# Patient Record
Sex: FEMALE | Race: WHITE | NOT HISPANIC OR LATINO | URBAN - METROPOLITAN AREA
[De-identification: names, ages, dates, MRNs, and addresses within clinical notes are randomized per-mention and may not be internally consistent; named-entity substitution may affect disease eponyms.]

---

## 2017-04-11 ENCOUNTER — IMPORTED ENCOUNTER (OUTPATIENT)
Dept: URBAN - METROPOLITAN AREA CLINIC 50 | Facility: CLINIC | Age: 67
End: 2017-04-11

## 2017-09-22 ENCOUNTER — IMPORTED ENCOUNTER (OUTPATIENT)
Dept: URBAN - METROPOLITAN AREA CLINIC 50 | Facility: CLINIC | Age: 67
End: 2017-09-22

## 2017-12-11 ENCOUNTER — IMPORTED ENCOUNTER (OUTPATIENT)
Dept: URBAN - METROPOLITAN AREA CLINIC 50 | Facility: CLINIC | Age: 67
End: 2017-12-11

## 2018-01-09 ENCOUNTER — IMPORTED ENCOUNTER (OUTPATIENT)
Dept: URBAN - METROPOLITAN AREA CLINIC 50 | Facility: CLINIC | Age: 68
End: 2018-01-09

## 2018-04-19 ENCOUNTER — IMPORTED ENCOUNTER (OUTPATIENT)
Dept: URBAN - METROPOLITAN AREA CLINIC 50 | Facility: CLINIC | Age: 68
End: 2018-04-19

## 2018-04-25 ENCOUNTER — IMPORTED ENCOUNTER (OUTPATIENT)
Dept: URBAN - METROPOLITAN AREA CLINIC 50 | Facility: CLINIC | Age: 68
End: 2018-04-25

## 2018-10-10 ENCOUNTER — IMPORTED ENCOUNTER (OUTPATIENT)
Dept: URBAN - METROPOLITAN AREA CLINIC 50 | Facility: CLINIC | Age: 68
End: 2018-10-10

## 2019-01-08 NOTE — PATIENT DISCUSSION
REFRACTIVE ERROR, OU - DISCUSSED OPTION OF CORRECTING AT THE TIME OF CATARACT SURGERY. PT UNDERSTANDS AND ELECTS TO PROCEED WITH BOTH EYES FOR DISTANCE AND UNDERSTANDS NEED FOR GLASSES FOR NEAR.

## 2019-01-08 NOTE — PATIENT DISCUSSION
CATARACT, OU - EQUALLY VISUALLY SIGNIFICANT ALTHOUGH PATIENT IS BOTHERED BY OS MORE . SCHEDULE SX OS THEN LATER IN OD IF VISUAL SYMPTOMS PERSIST.  GLS RX GIVEN TO FILL IF DESIRES IN THE EVENT PT DOES NOT PROCEED W/ SX.

## 2019-01-08 NOTE — PATIENT DISCUSSION
Surgery  Counseling: I have discussed the option of glasses versus cataract surgery versus following. It was explained that when vision no longer meets the patient's visual needs and a new prescription for glasses is not likely to improve the patient's visual symptoms, the option of cataract surgery is a reasonable next step. It was explained that there is no guarantee that removing the cataract will improve their visual symptoms, however; it is believed that the cataract is contributing to the patient's visual impairment and surgery may significantly improve both the visual and functional status of the patient. The risks, benefits and alternatives of surgery were discussed with the patient. After this discussion, the patient desires to proceed with cataract surgery with implantation of an intraocular lens to improve vision and reduce glare.

## 2019-01-30 NOTE — PATIENT DISCUSSION
BECAUSE OF INCREASED INFLAMMATION,  ADDED DUREZOL Q 2 HOURS.   IF WORSENS,  DR ANTONY CAN SEND HER BACK FOR FURTHER EVAL

## 2019-02-26 ENCOUNTER — IMPORTED ENCOUNTER (OUTPATIENT)
Dept: URBAN - METROPOLITAN AREA CLINIC 50 | Facility: CLINIC | Age: 69
End: 2019-02-26

## 2019-05-17 ENCOUNTER — IMPORTED ENCOUNTER (OUTPATIENT)
Dept: URBAN - METROPOLITAN AREA CLINIC 50 | Facility: CLINIC | Age: 69
End: 2019-05-17

## 2019-05-17 NOTE — PATIENT DISCUSSION
"""suspect based on c/d ratio. IOP controlled OU. Continue Latanoprost QHS OU.  Consider iStent inj ""

## 2019-05-22 ENCOUNTER — IMPORTED ENCOUNTER (OUTPATIENT)
Dept: URBAN - METROPOLITAN AREA CLINIC 50 | Facility: CLINIC | Age: 69
End: 2019-05-22

## 2019-07-16 ENCOUNTER — IMPORTED ENCOUNTER (OUTPATIENT)
Dept: URBAN - METROPOLITAN AREA CLINIC 50 | Facility: CLINIC | Age: 69
End: 2019-07-16

## 2019-08-02 ENCOUNTER — IMPORTED ENCOUNTER (OUTPATIENT)
Dept: URBAN - METROPOLITAN AREA CLINIC 50 | Facility: CLINIC | Age: 69
End: 2019-08-02

## 2019-08-14 ENCOUNTER — IMPORTED ENCOUNTER (OUTPATIENT)
Dept: URBAN - METROPOLITAN AREA CLINIC 50 | Facility: CLINIC | Age: 69
End: 2019-08-14

## 2019-08-14 NOTE — PATIENT DISCUSSION
"""S/P IOL OD: Tecgenesis  ZKB00 24 (ORA) +ORA/Femto +Omidria +iStent inject. Continue post operative instructions and drops per schedule.  Continue glaucoma drops as prescribed."""

## 2019-08-23 ENCOUNTER — IMPORTED ENCOUNTER (OUTPATIENT)
Dept: URBAN - METROPOLITAN AREA CLINIC 50 | Facility: CLINIC | Age: 69
End: 2019-08-23

## 2019-08-28 ENCOUNTER — IMPORTED ENCOUNTER (OUTPATIENT)
Dept: URBAN - METROPOLITAN AREA CLINIC 50 | Facility: CLINIC | Age: 69
End: 2019-08-28

## 2019-08-28 PROBLEM — Z96.1: Noted: 2019-08-28

## 2019-08-28 NOTE — PATIENT DISCUSSION
"""S/P IOL OS: Tecnis MF ZKB00 25.0 +Femto/Arcs +Omidria +iStent inject. Continue post operative instructions and drops per schedule.  Continue glaucoma drops as prescribed."""

## 2019-09-06 ENCOUNTER — IMPORTED ENCOUNTER (OUTPATIENT)
Dept: URBAN - METROPOLITAN AREA CLINIC 50 | Facility: CLINIC | Age: 69
End: 2019-09-06

## 2019-09-30 ENCOUNTER — IMPORTED ENCOUNTER (OUTPATIENT)
Dept: URBAN - METROPOLITAN AREA CLINIC 50 | Facility: CLINIC | Age: 69
End: 2019-09-30

## 2019-09-30 NOTE — PATIENT DISCUSSION
"""S/P iStent OU. IOP controlled OU. Last dose of Latanoprost OU 3 days ago.  Patient to continue ""

## 2019-09-30 NOTE — PATIENT DISCUSSION
"""S/P IOL OU: OD: Tecnis MF ZKB00 24 (ORA) +ORAFemtoOmidria +iStent inject. OS: Tecnis MF ZKB00 25.0/Arcs +iStent inject. "

## 2019-12-16 ENCOUNTER — IMPORTED ENCOUNTER (OUTPATIENT)
Dept: URBAN - METROPOLITAN AREA CLINIC 50 | Facility: CLINIC | Age: 69
End: 2019-12-16

## 2020-01-03 ENCOUNTER — IMPORTED ENCOUNTER (OUTPATIENT)
Dept: URBAN - METROPOLITAN AREA CLINIC 50 | Facility: CLINIC | Age: 70
End: 2020-01-03

## 2020-11-19 ENCOUNTER — IMPORTED ENCOUNTER (OUTPATIENT)
Dept: URBAN - METROPOLITAN AREA CLINIC 50 | Facility: CLINIC | Age: 70
End: 2020-11-19

## 2020-11-23 ENCOUNTER — IMPORTED ENCOUNTER (OUTPATIENT)
Dept: URBAN - METROPOLITAN AREA CLINIC 50 | Facility: CLINIC | Age: 70
End: 2020-11-23

## 2021-05-14 ASSESSMENT — VISUAL ACUITY
OS_SC: 20/50-
OD_PH: 20/25
OD_CC: J2@ 16 IN
OS_CC: J1
OS_OTHER: 20/40. 20/50.
OD_SC: 20/30-1
OS_CC: 20/20-2
OS_PH: @ 16 IN
OS_CC: J1
OD_BAT: 20/60
OS_PH: 20/30
OS_SC: 20/25
OS_CC: 20/30
OS_CC: J1+
OD_OTHER: 20/60. 20/80.
OS_SC: 20/30
OD_PH: @ 16 IN
OD_SC: 20/25
OD_SC: 20/25
OS_SC: 20/40
OS_CC: J2@ 17 IN
OS_CC: J1@ 16 IN
OD_PH: @ 16 IN
OD_OTHER: 20/60. 20/70.
OD_SC: 20/30
OD_PH: @ 20 IN
OS_BAT: 20/50
OD_CC: J1
OD_BAT: 20/40
OD_SC: 20/30-1
OS_OTHER: 20/50. 20/70.
OS_BAT: 20/40
OD_CC: J1
OD_BAT: 20/60
OD_SC: 20/70-
OD_CC: J2@ 17 IN
OS_SC: 20/25
OD_CC: J1+
OS_BAT: 20/40
OS_BAT: 20/50
OD_SC: 20/60+1
OD_OTHER: 20/60. 20/80.
OD_BAT: 20/60
OS_OTHER: 20/40. 20/50.
OS_SC: 20/25
OD_OTHER: 20/40. 20/50.
OS_CC: J2@ 16 IN
OS_PH: @ 16 IN
OD_CC: J1@ 16 IN
OS_OTHER: 20/50. 20/50.
OD_PH: 20/40
OD_PH: 20/25-
OD_CC: 20/100

## 2021-05-14 ASSESSMENT — TONOMETRY
OD_IOP_MMHG: 14
OD_IOP_MMHG: 16
OD_IOP_MMHG: 15
OD_IOP_MMHG: 17
OS_IOP_MMHG: 15
OS_IOP_MMHG: 26
OS_IOP_MMHG: 16
OD_IOP_MMHG: 15
OD_IOP_MMHG: 13
OS_IOP_MMHG: 17
OD_IOP_MMHG: 16
OS_IOP_MMHG: 15
OD_IOP_MMHG: 16
OS_IOP_MMHG: 14
OD_IOP_MMHG: 15
OS_IOP_MMHG: 16
OS_IOP_MMHG: 15
OD_IOP_MMHG: 16
OD_IOP_MMHG: 21
OD_IOP_MMHG: 16
OS_IOP_MMHG: 25
OD_IOP_MMHG: 16
OS_IOP_MMHG: 16
OS_IOP_MMHG: 13
OS_IOP_MMHG: 14
OD_IOP_MMHG: 14
OS_IOP_MMHG: 15

## 2021-05-14 ASSESSMENT — PACHYMETRY
OS_CT_UM: 571
OD_CT_UM: 570
OS_CT_UM: 571
OD_CT_UM: 570
OS_CT_UM: 571
OD_CT_UM: 570
OS_CT_UM: 571
OD_CT_UM: 570
OS_CT_UM: 571

## 2021-09-27 ENCOUNTER — ANNUAL COMPREHENSIVE EXAM (OUTPATIENT)
Dept: URBAN - METROPOLITAN AREA CLINIC 53 | Facility: CLINIC | Age: 71
End: 2021-09-27

## 2021-09-27 DIAGNOSIS — H35.342: ICD-10-CM

## 2021-09-27 DIAGNOSIS — H26.492: ICD-10-CM

## 2021-09-27 DIAGNOSIS — H26.491: ICD-10-CM

## 2021-09-27 DIAGNOSIS — H40.1130: ICD-10-CM

## 2021-09-27 PROCEDURE — 66821 AFTER CATARACT LASER SURGERY: CPT

## 2021-09-27 PROCEDURE — 92134 CPTRZ OPH DX IMG PST SGM RTA: CPT

## 2021-09-27 PROCEDURE — 92014 COMPRE OPH EXAM EST PT 1/>: CPT

## 2021-09-27 RX ORDER — PREDNISOLONE ACETATE 10 MG/ML
1 SUSPENSION/ DROPS OPHTHALMIC TWICE A DAY
Start: 2021-09-27

## 2021-09-27 ASSESSMENT — VISUAL ACUITY
OS_PH: 20/100
OD_PH: 20/40
OS_SC: 20/100
OD_SC: 20/50

## 2021-09-27 ASSESSMENT — TONOMETRY
OS_IOP_MMHG: 16
OD_IOP_MMHG: 16

## 2021-09-27 NOTE — PATIENT DISCUSSION
Will hold off on treatment at this time secondary to macular hole.  Will consider laser after evaluation and possible treatment from retina specialist.

## 2021-09-27 NOTE — PATIENT DISCUSSION
PCO ([id number]): Visually significant PCO present on exam today. Recommend YAG laser capsulotomy to improve vision and decrease glare symptoms. RBAs of procedure discussed. Patient agrees and wishes to proceed.

## 2021-09-27 NOTE — PROCEDURE NOTE: CLINICAL
PROCEDURE NOTE: YAG Capsulotomy #1 OD. Diagnosis: Posterior Capsular Opacification (PCO). Prep: Mydriacil 1% and Phenylephrine 2.5%. Prior to treatment, the risks/benefits/alternatives were discussed. The patient wished to proceed with procedure. Consent was signed. Proparacaine and brominidine were placed into the operative eye after the eye was dilated. Power = 3.6mJ. Number of pulses = 39. Patient tolerated procedure well and there were no complications. Post Laser instructions given. Mark Nicole

## 2022-06-15 ENCOUNTER — ESTABLISHED PATIENT (OUTPATIENT)
Dept: URBAN - METROPOLITAN AREA CLINIC 53 | Facility: CLINIC | Age: 72
End: 2022-06-15

## 2022-06-15 DIAGNOSIS — H26.492: ICD-10-CM

## 2022-06-15 DIAGNOSIS — H43.811: ICD-10-CM

## 2022-06-15 DIAGNOSIS — H40.1130: ICD-10-CM

## 2022-06-15 DIAGNOSIS — H35.342: ICD-10-CM

## 2022-06-15 DIAGNOSIS — E11.9: ICD-10-CM

## 2022-06-15 PROCEDURE — 92014 COMPRE OPH EXAM EST PT 1/>: CPT

## 2022-06-15 PROCEDURE — 92134 CPTRZ OPH DX IMG PST SGM RTA: CPT

## 2022-06-15 ASSESSMENT — TONOMETRY
OD_IOP_MMHG: 13
OS_IOP_MMHG: 13

## 2022-06-15 ASSESSMENT — VISUAL ACUITY
OU_CC: J1@14IN
OS_GLARE: 20/400
OD_GLARE: 20/50
OU_SC: 20/40
OS_PH: 20/400
OS_SC: 20/400-1
OS_GLARE: 20/150-1
OD_GLARE: 20/60
OD_PH: 20/30-1
OD_SC: 20/40

## 2022-06-15 ASSESSMENT — KERATOMETRY
OS_AXISANGLE2_DEGREES: 90
OS_K1POWER_DIOPTERS: 45.00
OD_AXISANGLE_DEGREES: 180
OD_AXISANGLE2_DEGREES: 90
OS_AXISANGLE_DEGREES: 180
OD_K1POWER_DIOPTERS: 44.50
OD_K2POWER_DIOPTERS: 44.75
OS_K2POWER_DIOPTERS: 45.00

## 2022-06-15 NOTE — PATIENT DISCUSSION
PCO ([id number]): Visually significant PCO present on exam today. Recommend YAG laser capsulotomy to improve vision and decrease glare symptoms. RBAs of procedure discussed. Patient agrees and wishes to proceed.

## 2022-06-15 NOTE — PATIENT DISCUSSION
Patient is currently followed by Dr. Cheko Moya. Patient has f/u appointment scheduled in 2 weeks. Discussed with patient today to possibly get a pair of glasses with protective glass.

## 2023-06-27 ENCOUNTER — COMPREHENSIVE EXAM (OUTPATIENT)
Dept: URBAN - METROPOLITAN AREA CLINIC 53 | Facility: CLINIC | Age: 73
End: 2023-06-27

## 2023-06-27 DIAGNOSIS — H43.811: ICD-10-CM

## 2023-06-27 DIAGNOSIS — H40.1130: ICD-10-CM

## 2023-06-27 DIAGNOSIS — H26.492: ICD-10-CM

## 2023-06-27 DIAGNOSIS — E11.9: ICD-10-CM

## 2023-06-27 DIAGNOSIS — H35.342: ICD-10-CM

## 2023-06-27 PROCEDURE — 92133 CPTRZD OPH DX IMG PST SGM ON: CPT

## 2023-06-27 PROCEDURE — 92014 COMPRE OPH EXAM EST PT 1/>: CPT

## 2023-06-27 PROCEDURE — 92134 CPTRZ OPH DX IMG PST SGM RTA: CPT

## 2023-06-27 ASSESSMENT — VISUAL ACUITY
OD_SC: 20/60
OS_SC: 20/400

## 2023-06-27 ASSESSMENT — TONOMETRY
OS_IOP_MMHG: 13
OD_IOP_MMHG: 13

## 2023-06-27 ASSESSMENT — KERATOMETRY
OD_AXISANGLE_DEGREES: 180
OS_K2POWER_DIOPTERS: 45.00
OS_AXISANGLE2_DEGREES: 90
OS_AXISANGLE_DEGREES: 180
OD_K1POWER_DIOPTERS: 44.50
OD_AXISANGLE2_DEGREES: 90
OS_K1POWER_DIOPTERS: 45.00
OD_K2POWER_DIOPTERS: 44.75

## 2025-05-25 NOTE — PATIENT DISCUSSION
Post-Op Instructions: The patient was instructed in the proper use of post-operative eye drops: pred-gati-brom in the surgical eye 3 times per day for 2 weeks, then 2 times per day for 1 week, then 1 time per day for 1 week, then discontinue. Recommended to the patient to continue PRN vitamins for 90 days or until their 90 days supply is gone. Call back instructions, retinal detachment and endophthalmitis precautions given. complains of pain/discomfort no known allergies